# Patient Record
Sex: FEMALE | Race: WHITE | Employment: FULL TIME | URBAN - METROPOLITAN AREA
[De-identification: names, ages, dates, MRNs, and addresses within clinical notes are randomized per-mention and may not be internally consistent; named-entity substitution may affect disease eponyms.]

---

## 2017-03-29 DIAGNOSIS — I21.4 NSTEMI (NON-ST ELEVATED MYOCARDIAL INFARCTION) (H): ICD-10-CM

## 2017-04-17 ENCOUNTER — CARE COORDINATION (OUTPATIENT)
Dept: CARDIOLOGY | Facility: CLINIC | Age: 67
End: 2017-04-17

## 2017-04-17 DIAGNOSIS — I10 BENIGN ESSENTIAL HYPERTENSION: ICD-10-CM

## 2017-04-17 RX ORDER — LISINOPRIL 2.5 MG/1
2.5 TABLET ORAL DAILY
Qty: 90 TABLET | Refills: 3 | Status: SHIPPED | OUTPATIENT
Start: 2017-04-17 | End: 2018-04-30

## 2017-06-17 DIAGNOSIS — I21.4 NSTEMI (NON-ST ELEVATED MYOCARDIAL INFARCTION) (H): ICD-10-CM

## 2017-08-28 ENCOUNTER — PRE VISIT (OUTPATIENT)
Dept: CARDIOLOGY | Facility: CLINIC | Age: 67
End: 2017-08-28

## 2017-08-28 NOTE — TELEPHONE ENCOUNTER
Abdominal Aortic U/S: 11/5/2014  IMPRESSION  Impression: Negative exam without aneurysm.        Carotid U/S: 11/5/2014  IMPRESSION  Impression:  1. Right side: Mikaela right internal carotid artery.  2. Left side: Normal left internal carotid artery.        Echo: 9/16/2014   Interpretation Summary  Global and regional left ventricular function is normal with an EF of   55-60%.   Right ventricular function, chamber size, wall motion, and thickness are   normal. No pericardial effusion is present.  PatientHeight: 66 in  PatientWeight: 110 lbs  SystolicPressure: 117 mmHg  DiastolicPressure: 81 mmHg  HeartRate: 66 bpm  BSA 1.6 m^2         CCL: 9/16/2014   63 year old female with risk factor profile (-) HTN, (-) DM, (-) hypercholesterolemia, (-) tobacco use, (-) fam Hx premature CAD, no cardiac history, presented to [facility] with 3 weeks of recurrent chest discomfort. ECG showed NSR without ST shift, TWI, or Q waves. Troponin 0.11 peak.   Access: RT RADIAL. With initial access, I was unable to pass wire. Second puncture required. IA Verapamil, IA NTG, IV UFH   Catheters: 5 Fr Antonio, 6 Fr Ikari 3.5 LF   Coronary angiography showed a LF dominant coronary system with single vessel CAD. The RCA was nondominant, small in caliber and free of disease. The LMCA was very short and free of disease. The LCx was large in caliber and provided an OM1, PL, and PDA. There was no disease in the LCx system. The LAD had mild disease proximally and a 80% focal stenosis in the mid segment. There was minimal disease in the remainder of the LAD. D1 had a 90% proximal stenosis and while small in caliber supplied a large are of myocardium. There were 3 additional small diagonal branches without apparent disease.   LVEDP 10 mm Hg. No gradient.   SEE PTCA SECTION for details of PCI of mid LAD and D1.   Research Protocol: Lipid Rich Plaque. IVUS performed on LAD / LCx.   At completion of the procedure, radial sheath was removed with a radial band.    Impression:   1. NSTEMI   2. Single vessel CAD (D1, LAD)   3. Successful stenting of D1 with BEVERLY   4. Successful stenting of mid LAD with BEVERLY   Plan: ASA, Ticagrelor, Beta Blocker, ACE-I, Statin

## 2017-08-29 ASSESSMENT — ENCOUNTER SYMPTOMS
LIGHT-HEADEDNESS: 0
LEG PAIN: 0
WEIGHT GAIN: 0
POLYPHAGIA: 0
TACHYCARDIA: 0
INCREASED ENERGY: 1
CHILLS: 0
SYNCOPE: 0
ORTHOPNEA: 0
DECREASED APPETITE: 0
HYPOTENSION: 0
HYPERTENSION: 0
LEG SWELLING: 0
FEVER: 0
EXERCISE INTOLERANCE: 1
HALLUCINATIONS: 0
ALTERED TEMPERATURE REGULATION: 1
CLAUDICATION: 0
POLYDIPSIA: 0
SLEEP DISTURBANCES DUE TO BREATHING: 0
PALPITATIONS: 1
WEIGHT LOSS: 0
FATIGUE: 1
NIGHT SWEATS: 0

## 2017-09-01 ENCOUNTER — OFFICE VISIT (OUTPATIENT)
Dept: CARDIOLOGY | Facility: CLINIC | Age: 67
End: 2017-09-01
Attending: INTERNAL MEDICINE
Payer: MEDICARE

## 2017-09-01 ENCOUNTER — RADIANT APPOINTMENT (OUTPATIENT)
Dept: CARDIOLOGY | Facility: CLINIC | Age: 67
End: 2017-09-01

## 2017-09-01 VITALS
HEIGHT: 66 IN | WEIGHT: 113.9 LBS | SYSTOLIC BLOOD PRESSURE: 124 MMHG | BODY MASS INDEX: 18.3 KG/M2 | DIASTOLIC BLOOD PRESSURE: 80 MMHG | HEART RATE: 63 BPM | OXYGEN SATURATION: 98 %

## 2017-09-01 DIAGNOSIS — I25.10 CORONARY ARTERY DISEASE DUE TO LIPID RICH PLAQUE: ICD-10-CM

## 2017-09-01 DIAGNOSIS — I25.83 CORONARY ARTERY DISEASE DUE TO LIPID RICH PLAQUE: ICD-10-CM

## 2017-09-01 DIAGNOSIS — Z95.820 S/P ANGIOPLASTY WITH STENT: ICD-10-CM

## 2017-09-01 DIAGNOSIS — Z95.820 S/P ANGIOPLASTY WITH STENT: Primary | ICD-10-CM

## 2017-09-01 PROCEDURE — 99212 OFFICE O/P EST SF 10 MIN: CPT | Mod: ZF

## 2017-09-01 PROCEDURE — 99213 OFFICE O/P EST LOW 20 MIN: CPT | Mod: ZP | Performed by: INTERNAL MEDICINE

## 2017-09-01 PROCEDURE — 99213 OFFICE O/P EST LOW 20 MIN: CPT | Mod: ZF

## 2017-09-01 RX ORDER — PREDNISONE 5 MG/1
TABLET ORAL
COMMUNITY
Start: 2017-08-30

## 2017-09-01 RX ORDER — ALENDRONATE SODIUM 70 MG/1
TABLET ORAL
COMMUNITY
Start: 2017-06-27

## 2017-09-01 ASSESSMENT — PAIN SCALES - GENERAL: PAINLEVEL: NO PAIN (0)

## 2017-09-01 NOTE — MR AVS SNAPSHOT
After Visit Summary   9/1/2017    Ekaterina Musa    MRN: [id number]           Patient Information     Date Of Birth          1950        Visit Information        Provider Department      9/1/2017 11:00 AM Mayelin Anderson MD [facility]        Today's Diagnoses     S/P angioplasty with stent    -  1    Coronary artery disease due to lipid rich plaque          Care Instructions    Thank you for your visit today.  Please call me with any questions or concerns.   Nabeel Ayala RN  Cardiology Care Coordinator  [phone], press option 1 then option 3          Follow-ups after your visit        Follow-up notes from your care team     Return in about 2 years (around 9/1/2019) for CAD, Dr. Anderson.      Your next 10 appointments already scheduled     Sep 01, 2017  1:00 PM CDT   Ech Complete with [id number]   [facility] Echo ([facility])    [address] [place] [id number] [phone]           1. Please bring or wear a comfortable two-piece outfit. 2. You may eat, drink and take your normal medicines. 3. For any questions that cannot be answered, please contact the ordering physician              Future tests that were ordered for you today     Open Future Orders        Priority Expected Expires Ordered    Echocardiogram Complete Routine  9/1/2018 9/1/2017            Who to contact     If you have questions or need follow up information about today's clinic visit or your schedule please contact [facility] directly at [phone].  Normal or non-critical lab and imaging results will be communicated to you by MyChart, letter or phone within 4 business days after the clinic has received the results. If you do not hear from us within 7 days, please contact the clinic through MyChart or phone. If you have a critical or abnormal lab result, we will notify you by phone as soon as possible.  Submit refill requests through [employer] or  "call your pharmacy and they will forward the refill request to us. Please allow 3 business days for your refill to be completed.          Additional Information About Your Visit        MyChart Information     [email or web address] gives you secure access to your electronic health record. If you see a primary care provider, you can also send messages to your care team and make appointments. If you have questions, please call your primary care clinic.  If you do not have a primary care provider, please call [phone] and they will assist you.        Care EveryWhere ID     This is your Care EveryWhere ID. This could be used by other organizations to access your [place] medical records  [id number]        Your Vitals Were     Pulse Height Pulse Oximetry BMI (Body Mass Index)          63 1.664 m (5' 5.5\") 98% 18.67 kg/m2         Blood Pressure from Last 3 Encounters:   09/01/17 124/80   09/03/15 102/64   11/12/14 138/78    Weight from Last 3 Encounters:   09/01/17 51.7 kg (113 lb 14.4 oz)   09/03/15 50 kg (110 lb 3.2 oz)   11/12/14 51.5 kg (113 lb 9.6 oz)                 Today's Medication Changes          These changes are accurate as of: 9/1/17 11:36 AM.  If you have any questions, ask your nurse or doctor.               Stop taking these medicines if you haven't already. Please contact your care team if you have questions.     aspirin 81 MG EC tablet   Stopped by:  Mayelin Anderson MD                    Primary Care Provider Office Phone # Fax #    Lisandra Eckhoff [phone]       [facility] [address] [place] [address]        Equal Access to Services     [place] campus[place]A AH: Hadii jeane Guevara, magalys kirby, qaybmayra vanessaalgreg naidu. So [facility] [phone].    ATENCIÓN: Si habla español, tiene a ennis disposición servicios gratuitos de asistencia lingüística. Marylin al [phone].    We comply with applicable federal civil rights laws " and [place] laws. We do not discriminate on the basis of race, color, national origin, age, disability sex, sexual orientation or gender identity.            Thank you!     Thank you for choosing [facility]  for your care. Our goal is always to provide you with excellent care. Hearing back from our patients is one way we can continue to improve our services. Please take a few minutes to complete the written survey that you may receive in the mail after your visit with us. Thank you!             Your Updated Medication List - Protect others around you: Learn how to safely use, store and throw away your medicines at www.disposemymeds.org.          This list is accurate as of: 9/1/17 11:36 AM.  Always use your most recent med list.                   Brand Name Dispense Instructions for use Diagnosis    alendronate 70 MG tablet    FOSAMAX          atorvastatin 40 MG tablet    LIPITOR    90 tablet    Take 1 tablet (40 mg) by mouth daily    NSTEMI (non-ST elevated myocardial infarction) (H)       FOLIC ACID PO      Take 1 mg by mouth daily        lisinopril 2.5 MG tablet    PRINIVIL/Zestril    90 tablet    Take 1 tablet (2.5 mg) by mouth daily    Benign essential hypertension       METHOTREXATE PO      Take 15 mg by mouth once a week        predniSONE 5 MG tablet    DELTASONE     All in am: 5 mg/d.        TRAZODONE HCL PO      Take 50 mg by mouth At Bedtime        VITAMIN D (CHOLECALCIFEROL) PO      Take 4,000 Units by mouth daily

## 2017-09-01 NOTE — NURSING NOTE
Cardiac Testing: Patient given instructions regarding  echocardiogram in the near future. Discussed purpose, preparation, procedure and when to expect results reported back to the patient. Patient demonstrated understanding of this information and agreed to call with further questions or concerns.  Med Reconcile: Reviewed and verified all current medications with the patient. The updated medication list was printed and given to the patient.  Return Appointment: Follow up in two years. Patient given instructions regarding scheduling next clinic visit. Patient demonstrated understanding of this information and agreed to call with further questions or concerns.  Patient stated she understood all health information given and agreed to call with further questions or concerns.

## 2017-09-01 NOTE — NURSING NOTE
Chief Complaint   Patient presents with     Follow Up For     History of NSTEMI and PCI with BEVERLY to mid-LAD and D1.     Vitals were taken and medications were reconciled.     Moisés Trujillo MA  10:45 AM

## 2017-09-01 NOTE — LETTER
9/1/2017      RE: Ekaterina Musa  [address]   [facility] [address]       Dear Colleague,    Thank you for the opportunity to participate in the care of your patient, Ekaterina Musa, at the [facility] HEART [facility]. Please see a copy of my visit note below.    HPI:      Ekaterina Musa is a 66 year old woman with history of rheumatoid arthritis who presents today to the cardiology clinic for follow up after NSTEMI and PCI with BEVERLY to mid-LAD and D1 in 9/14.      Overall feels well. No chest pains. No dyspnea, palpitations or syncope. Some fatigue of unclear etiology.    Exercises at a health club and also in the exercise room of her condo.      PAST MEDICAL HISTORY:  No past medical history on file.    CURRENT MEDICATIONS:  Current Outpatient Prescriptions   Medication Sig Dispense Refill     lisinopril (PRINIVIL/ZESTRIL) 2.5 MG tablet Take 1 tablet (2.5 mg) by mouth daily 90 tablet 3     aspirin 81 MG EC tablet Take 1 tablet (81 mg) by mouth daily 90 tablet 3     atorvastatin (LIPITOR) 40 MG tablet Take 1 tablet (40 mg) by mouth daily 90 tablet 3     FOLIC ACID PO Take 1 mg by mouth daily        VITAMIN D, CHOLECALCIFEROL, PO Take 4,000 Units by mouth daily       Methotrexate Sodium (METHOTREXATE PO) Take 15 mg by mouth once a week        TRAZODONE HCL PO Take 50 mg by mouth At Bedtime          PAST SURGICAL HISTORY:  No past surgical history on file.    ALLERGIES     Allergies   Allergen Reactions     Sulfasalazine      Stomach upset, more aching, itching, headache.       FAMILY HISTORY:  No family history on file.    SOCIAL HISTORY:  History     Social History     Marital Status:      Spouse Name: N/A     Number of Children: N/A     Years of Education: N/A     Social History Main Topics     Smoking status: Never Smoker      Smokeless tobacco: None     Alcohol Use: None     Drug Use: None     Sexual Activity: None     Other Topics  "Concern     None     Social History Narrative       ROS:   Constitutional: No fever, chills, or sweats. No weight gain/loss   ENT: No visual disturbance, ear ache, epistaxis, sore throat  Allergies/Immunologic: Negative.   Respiratory: No cough, hemoptysia  Cardiovascular: As per HPI  GI: No nausea, vomiting, hematemesis, melena, or hematochezia  : No urinary frequency, dysuria, or hematuria  Integument: Negative  Psychiatric: Negative  Neuro: Negative  Endocrinology: Negative   Musculoskeletal: Negative    EXAM:  Ht 1.664 m (5' 5.5\")  Wt 51.7 kg (113 lb 14.4 oz)  BMI 18.67 kg/m2  In general, the patient is a pleasant female in no apparent distress.    JVD 0. No carotid bruit. No pedal edema  Lungs - CTA  CVS - S1 S2 Normal. No murmurs  Abd - Soft   CNS- Non focal      Labs:  LIPID RESULTS:  Lab Results   Component Value Date    CHOL 190 09/17/2014    HDL 66 09/17/2014     09/17/2014    TRIG 119 09/17/2014    CHOLHDLRATIO 2.9 09/17/2014       LIVER ENZYME RESULTS:  Lab Results   Component Value Date    AST 31 09/15/2014    ALT 20 09/15/2014       CBC RESULTS:  Lab Results   Component Value Date    WBC 5.3 09/17/2014    RBC 3.99 09/17/2014    HGB 12.8 09/17/2014    HCT 38.6 09/17/2014    MCV 97 09/17/2014    MCH 32.1 09/17/2014    MCHC 33.2 09/17/2014    RDW 14.1 09/17/2014     09/17/2014       BMP RESULTS:  Lab Results   Component Value Date     09/17/2014    POTASSIUM 4.0 09/17/2014    CHLORIDE 109 09/17/2014    CO2 24 09/17/2014    ANIONGAP 6 09/17/2014    GLC 86 09/17/2014    BUN 12 09/17/2014    CR 0.57 09/17/2014    GFRESTIMATED >90  Non  GFR Calc   09/17/2014    GFRESTBLACK >90   GFR Calc   09/17/2014    LUCIA 8.6 09/17/2014        A1C RESULTS:  Lab Results   Component Value Date    A1C 5.2 09/16/2014       INR RESULTS:  Lab Results   Component Value Date    INR 1.02 09/15/2014       Procedures:  Abdominal Aortic U/S: 11/5/2014  IMPRESSION  Impression: " Negative exam without aneurysm.      Carotid U/S: 11/5/2014  IMPRESSION  Impression:  1. Right side: Mikaela right internal carotid artery.  2. Left side: Normal left internal carotid artery.      Echo: 9/16/2014   Interpretation Summary  Global and regional left ventricular function is normal with an EF of   55-60%.   Right ventricular function, chamber size, wall motion, and thickness are   normal. No pericardial effusion is present.  PatientHeight: 66 in  PatientWeight: 110 lbs  SystolicPressure: 117 mmHg  DiastolicPressure: 81 mmHg  HeartRate: 66 bpm  BSA 1.6 m^2       CCL: 9/16/2014   63 year old female with risk factor profile (-) HTN, (-) DM, (-) hypercholesterolemia, (-) tobacco use, (-) fam Hx premature CAD, no cardiac history, presented to [facility] with 3 weeks of recurrent chest discomfort. ECG showed NSR without ST shift, TWI, or Q waves. Troponin 0.11 peak.   Access: RT RADIAL. With initial access, I was unable to pass wire. Second puncture required. IA Verapamil, IA NTG, IV UFH   Catheters: 5 Fr Antonio, 6 Fr Ikari 3.5 LF   Coronary angiography showed a LF dominant coronary system with single vessel CAD. The RCA was nondominant, small in caliber and free of disease. The LMCA was very short and free of disease. The LCx was large in caliber and provided an OM1, PL, and PDA. There was no disease in the LCx system. The LAD had mild disease proximally and a 80% focal stenosis in the mid segment. There was minimal disease in the remainder of the LAD. D1 had a 90% proximal stenosis and while small in caliber supplied a large are of myocardium. There were 3 additional small diagonal branches without apparent disease.   LVEDP 10 mm Hg. No gradient.   SEE PTCA SECTION for details of PCI of mid LAD and D1.   Research Protocol: Lipid Rich Plaque. IVUS performed on LAD / LCx.   At completion of the procedure, radial sheath was removed with a radial band.   Impression:   1. NSTEMI   2. Single vessel CAD (D1, LAD)   3.  Successful stenting of D1 with BEVERLY   4. Successful stenting of mid LAD with BEVERLY   Plan: ASA, Ticagrelor, Beta Blocker, ACE-I, Statin       Assessment and Plan:     Sandee is doing well. Her last Lipid panel results on our records is from 2014. She will have this done at her primary care clinic and provide us with the results.    1. Continue with current medications.  2. Review in 2 years.      Mayelin Anderson MD

## 2017-09-01 NOTE — PROGRESS NOTES
HPI:      Ekaterina Musa is a 66 year old woman with history of rheumatoid arthritis who presents today to the cardiology clinic for follow up after NSTEMI and PCI with BEVERLY to mid-LAD and D1 in 9/14.      Overall feels well. No chest pains. No dyspnea, palpitations or syncope. Some fatigue of unclear etiology.    Exercises at a health club and also in the exercise room of her condo.      PAST MEDICAL HISTORY:  No past medical history on file.    CURRENT MEDICATIONS:  Current Outpatient Prescriptions   Medication Sig Dispense Refill     lisinopril (PRINIVIL/ZESTRIL) 2.5 MG tablet Take 1 tablet (2.5 mg) by mouth daily 90 tablet 3     aspirin 81 MG EC tablet Take 1 tablet (81 mg) by mouth daily 90 tablet 3     atorvastatin (LIPITOR) 40 MG tablet Take 1 tablet (40 mg) by mouth daily 90 tablet 3     FOLIC ACID PO Take 1 mg by mouth daily        VITAMIN D, CHOLECALCIFEROL, PO Take 4,000 Units by mouth daily       Methotrexate Sodium (METHOTREXATE PO) Take 15 mg by mouth once a week        TRAZODONE HCL PO Take 50 mg by mouth At Bedtime          PAST SURGICAL HISTORY:  No past surgical history on file.    ALLERGIES     Allergies   Allergen Reactions     Sulfasalazine      Stomach upset, more aching, itching, headache.       FAMILY HISTORY:  No family history on file.    SOCIAL HISTORY:  History     Social History     Marital Status:      Spouse Name: N/A     Number of Children: N/A     Years of Education: N/A     Social History Main Topics     Smoking status: Never Smoker      Smokeless tobacco: None     Alcohol Use: None     Drug Use: None     Sexual Activity: None     Other Topics Concern     None     Social History Narrative       ROS:   Constitutional: No fever, chills, or sweats. No weight gain/loss   ENT: No visual disturbance, ear ache, epistaxis, sore throat  Allergies/Immunologic: Negative.   Respiratory: No cough, hemoptysia  Cardiovascular: As per HPI  GI: No nausea, vomiting, hematemesis, melena, or  "hematochezia  : No urinary frequency, dysuria, or hematuria  Integument: Negative  Psychiatric: Negative  Neuro: Negative  Endocrinology: Negative   Musculoskeletal: Negative    EXAM:  Ht 1.664 m (5' 5.5\")  Wt 51.7 kg (113 lb 14.4 oz)  BMI 18.67 kg/m2  In general, the patient is a pleasant female in no apparent distress.    JVD 0. No carotid bruit. No pedal edema  Lungs - CTA  CVS - S1 S2 Normal. No murmurs  Abd - Soft   CNS- Non focal      Labs:  LIPID RESULTS:  Lab Results   Component Value Date    CHOL 190 09/17/2014    HDL 66 09/17/2014     09/17/2014    TRIG 119 09/17/2014    CHOLHDLRATIO 2.9 09/17/2014       LIVER ENZYME RESULTS:  Lab Results   Component Value Date    AST 31 09/15/2014    ALT 20 09/15/2014       CBC RESULTS:  Lab Results   Component Value Date    WBC 5.3 09/17/2014    RBC 3.99 09/17/2014    HGB 12.8 09/17/2014    HCT 38.6 09/17/2014    MCV 97 09/17/2014    MCH 32.1 09/17/2014    MCHC 33.2 09/17/2014    RDW 14.1 09/17/2014     09/17/2014       BMP RESULTS:  Lab Results   Component Value Date     09/17/2014    POTASSIUM 4.0 09/17/2014    CHLORIDE 109 09/17/2014    CO2 24 09/17/2014    ANIONGAP 6 09/17/2014    GLC 86 09/17/2014    BUN 12 09/17/2014    CR 0.57 09/17/2014    GFRESTIMATED >90  Non  GFR Calc   09/17/2014    GFRESTBLACK >90   GFR Calc   09/17/2014    LUCIA 8.6 09/17/2014        A1C RESULTS:  Lab Results   Component Value Date    A1C 5.2 09/16/2014       INR RESULTS:  Lab Results   Component Value Date    INR 1.02 09/15/2014       Procedures:  Abdominal Aortic U/S: 11/5/2014  IMPRESSION  Impression: Negative exam without aneurysm.      Carotid U/S: 11/5/2014  IMPRESSION  Impression:  1. Right side: Mikaela right internal carotid artery.  2. Left side: Normal left internal carotid artery.      Echo: 9/16/2014   Interpretation Summary  Global and regional left ventricular function is normal with an EF of   55-60%.   Right ventricular " function, chamber size, wall motion, and thickness are   normal. No pericardial effusion is present.  PatientHeight: 66 in  PatientWeight: 110 lbs  SystolicPressure: 117 mmHg  DiastolicPressure: 81 mmHg  HeartRate: 66 bpm  BSA 1.6 m^2       CCL: 9/16/2014   63 year old female with risk factor profile (-) HTN, (-) DM, (-) hypercholesterolemia, (-) tobacco use, (-) fam Hx premature CAD, no cardiac history, presented to [facility] with 3 weeks of recurrent chest discomfort. ECG showed NSR without ST shift, TWI, or Q waves. Troponin 0.11 peak.   Access: RT RADIAL. With initial access, I was unable to pass wire. Second puncture required. IA Verapamil, IA NTG, IV UFH   Catheters: 5 Fr Antonio, 6 Fr Ikari 3.5 LF   Coronary angiography showed a LF dominant coronary system with single vessel CAD. The RCA was nondominant, small in caliber and free of disease. The LMCA was very short and free of disease. The LCx was large in caliber and provided an OM1, PL, and PDA. There was no disease in the LCx system. The LAD had mild disease proximally and a 80% focal stenosis in the mid segment. There was minimal disease in the remainder of the LAD. D1 had a 90% proximal stenosis and while small in caliber supplied a large are of myocardium. There were 3 additional small diagonal branches without apparent disease.   LVEDP 10 mm Hg. No gradient.   SEE PTCA SECTION for details of PCI of mid LAD and D1.   Research Protocol: Lipid Rich Plaque. IVUS performed on LAD / LCx.   At completion of the procedure, radial sheath was removed with a radial band.   Impression:   1. NSTEMI   2. Single vessel CAD (D1, LAD)   3. Successful stenting of D1 with BEVERLY   4. Successful stenting of mid LAD with BEVERLY   Plan: ASA, Ticagrelor, Beta Blocker, ACE-I, Statin       Assessment and Plan:     Sandee is doing well. Her last Lipid panel results on our records is from 2014. She will have this done at her primary care clinic and provide us with the results.    1. Continue  with current medications.  2. Review in 2 years.      CC  ESTABLISHED PATIENT

## 2017-09-01 NOTE — PATIENT INSTRUCTIONS
Thank you for your visit today.  Please call me with any questions or concerns.   Nabeel Ayala RN  Cardiology Care Coordinator  [phone], press option 1 then option 3

## 2018-04-30 ENCOUNTER — TELEPHONE (OUTPATIENT)
Dept: CARDIOLOGY | Facility: CLINIC | Age: 68
End: 2018-04-30

## 2018-04-30 DIAGNOSIS — I21.4 NSTEMI (NON-ST ELEVATED MYOCARDIAL INFARCTION) (H): ICD-10-CM

## 2018-04-30 DIAGNOSIS — I10 BENIGN ESSENTIAL HYPERTENSION: ICD-10-CM

## 2018-04-30 RX ORDER — LISINOPRIL 2.5 MG/1
2.5 TABLET ORAL DAILY
Qty: 90 TABLET | Refills: 3 | Status: SHIPPED | OUTPATIENT
Start: 2018-04-30 | End: 2018-04-30

## 2018-04-30 RX ORDER — ATORVASTATIN CALCIUM 40 MG/1
40 TABLET, FILM COATED ORAL DAILY
Qty: 90 TABLET | Refills: 3 | Status: SHIPPED | OUTPATIENT
Start: 2018-04-30 | End: 2018-04-30

## 2018-04-30 RX ORDER — ATORVASTATIN CALCIUM 40 MG/1
40 TABLET, FILM COATED ORAL DAILY
Qty: 90 TABLET | Refills: 3 | Status: SHIPPED | OUTPATIENT
Start: 2018-04-30

## 2018-04-30 RX ORDER — LISINOPRIL 2.5 MG/1
2.5 TABLET ORAL DAILY
Qty: 90 TABLET | Refills: 3 | Status: SHIPPED | OUTPATIENT
Start: 2018-04-30

## 2018-04-30 NOTE — TELEPHONE ENCOUNTER
Received call from Pt requesting refills on Lisinopril and Atorvastatin.  Initially ordered under Dr Anderson. Reordered under Dr Crystal per Nabeel BUSH RN as Dr Anderson is no longer here.  Contacted Pharmacy requesting they disregard first prescriptions sent.      Gi Stanley LPN

## 2019-10-01 ENCOUNTER — HEALTH MAINTENANCE LETTER (OUTPATIENT)
Age: 69
End: 2019-10-01

## 2019-12-15 ENCOUNTER — HEALTH MAINTENANCE LETTER (OUTPATIENT)
Age: 69
End: 2019-12-15

## 2021-01-15 ENCOUNTER — HEALTH MAINTENANCE LETTER (OUTPATIENT)
Age: 71
End: 2021-01-15

## 2021-09-04 ENCOUNTER — HEALTH MAINTENANCE LETTER (OUTPATIENT)
Age: 71
End: 2021-09-04

## 2021-10-30 ENCOUNTER — HEALTH MAINTENANCE LETTER (OUTPATIENT)
Age: 71
End: 2021-10-30

## 2022-02-19 ENCOUNTER — HEALTH MAINTENANCE LETTER (OUTPATIENT)
Age: 72
End: 2022-02-19

## 2022-10-16 ENCOUNTER — HEALTH MAINTENANCE LETTER (OUTPATIENT)
Age: 72
End: 2022-10-16

## 2023-04-01 ENCOUNTER — HEALTH MAINTENANCE LETTER (OUTPATIENT)
Age: 73
End: 2023-04-01

## 2023-11-04 ENCOUNTER — HEALTH MAINTENANCE LETTER (OUTPATIENT)
Age: 73
End: 2023-11-04